# Patient Record
Sex: MALE | Race: BLACK OR AFRICAN AMERICAN | Employment: UNEMPLOYED | URBAN - METROPOLITAN AREA
[De-identification: names, ages, dates, MRNs, and addresses within clinical notes are randomized per-mention and may not be internally consistent; named-entity substitution may affect disease eponyms.]

---

## 2023-03-06 ENCOUNTER — INITIAL CONSULT (OUTPATIENT)
Dept: SURGERY | Age: 63
End: 2023-03-06
Payer: COMMERCIAL

## 2023-03-06 VITALS
BODY MASS INDEX: 26.77 KG/M2 | SYSTOLIC BLOOD PRESSURE: 141 MMHG | WEIGHT: 202 LBS | DIASTOLIC BLOOD PRESSURE: 68 MMHG | HEART RATE: 98 BPM | HEIGHT: 73 IN

## 2023-03-06 DIAGNOSIS — R10.31 INGUINODYNIA, RIGHT: ICD-10-CM

## 2023-03-06 DIAGNOSIS — K40.91 UNILATERAL RECURRENT INGUINAL HERNIA WITHOUT OBSTRUCTION OR GANGRENE: Primary | ICD-10-CM

## 2023-03-06 PROCEDURE — G8428 CUR MEDS NOT DOCUMENT: HCPCS | Performed by: SURGERY

## 2023-03-06 PROCEDURE — 99243 OFF/OP CNSLTJ NEW/EST LOW 30: CPT | Performed by: SURGERY

## 2023-03-06 PROCEDURE — G8419 CALC BMI OUT NRM PARAM NOF/U: HCPCS | Performed by: SURGERY

## 2023-03-06 PROCEDURE — G8484 FLU IMMUNIZE NO ADMIN: HCPCS | Performed by: SURGERY

## 2023-03-06 NOTE — PROGRESS NOTES
agree with above and more than half of the total time was spent by me on the encounter. Pain in right groin. No bulge felt. No change in bowels. No fevers. No hernia felt on exam.  Discussed possible causes. Will get CT to see if present. If not then may need attempt at injection for inguinodynia.   Britt Wood MD

## 2023-03-15 ENCOUNTER — HOSPITAL ENCOUNTER (OUTPATIENT)
Dept: CT IMAGING | Age: 63
Discharge: HOME | End: 2023-03-15
Payer: COMMERCIAL

## 2023-03-15 DIAGNOSIS — R10.31 INGUINODYNIA, RIGHT: ICD-10-CM

## 2023-03-15 DIAGNOSIS — K40.91 UNILATERAL RECURRENT INGUINAL HERNIA WITHOUT OBSTRUCTION OR GANGRENE: ICD-10-CM

## 2023-03-15 LAB
CREAT SERPL-MCNC: 0.9 MG/DL (ref 0.8–1.3)
GFR SERPLBLD CREATININE-BSD FMLA CKD-EPI: >60 ML/MIN/{1.73_M2}

## 2023-03-15 PROCEDURE — 6360000004 HC RX CONTRAST MEDICATION

## 2023-03-15 PROCEDURE — 36415 COLL VENOUS BLD VENIPUNCTURE: CPT

## 2023-03-15 PROCEDURE — 82565 ASSAY OF CREATININE: CPT

## 2023-03-15 PROCEDURE — 74177 CT ABD & PELVIS W/CONTRAST: CPT

## 2023-03-15 RX ADMIN — IOPAMIDOL 100 ML: 755 INJECTION, SOLUTION INTRAVENOUS at 07:55

## 2023-03-23 ENCOUNTER — TELEPHONE (OUTPATIENT)
Dept: SURGERY | Age: 63
End: 2023-03-23

## 2023-03-23 DIAGNOSIS — Z98.890 S/P RIGHT INGUINAL HERNIA REPAIR: Primary | ICD-10-CM

## 2023-03-23 DIAGNOSIS — Z87.19 S/P RIGHT INGUINAL HERNIA REPAIR: Primary | ICD-10-CM

## 2023-03-23 NOTE — TELEPHONE ENCOUNTER
Per Dr. Aleksandr Tran: no recurrent right inguinal hernia, refer to pain clinic for injections. Left message for patient to call office.

## 2023-03-23 NOTE — TELEPHONE ENCOUNTER
Pt called and would like to know results of the CT Scan he had on 3/15/23 per Dr. Shawna Germain. Please call him and thank you!

## 2023-03-28 NOTE — TELEPHONE ENCOUNTER
Spoke with patient and notified results of CT. Patient states he does not want to do injections at this time.